# Patient Record
Sex: MALE | Race: WHITE | Employment: FULL TIME | URBAN - METROPOLITAN AREA
[De-identification: names, ages, dates, MRNs, and addresses within clinical notes are randomized per-mention and may not be internally consistent; named-entity substitution may affect disease eponyms.]

---

## 2018-10-08 ENCOUNTER — HOSPITAL ENCOUNTER (EMERGENCY)
Age: 23
Discharge: HOME | End: 2018-10-08
Payer: COMMERCIAL

## 2018-10-08 VITALS
HEART RATE: 68 BPM | DIASTOLIC BLOOD PRESSURE: 90 MMHG | BODY MASS INDEX: 21.56 KG/M2 | OXYGEN SATURATION: 100 % | HEIGHT: 71 IN | SYSTOLIC BLOOD PRESSURE: 138 MMHG | RESPIRATION RATE: 16 BRPM | TEMPERATURE: 97.8 F | WEIGHT: 154 LBS

## 2018-10-08 DIAGNOSIS — F07.81 POST CONCUSSIVE SYNDROME: Primary | ICD-10-CM

## 2018-10-08 DIAGNOSIS — S02.2XXA CLOSED FRACTURE OF NASAL BONE, INITIAL ENCOUNTER: ICD-10-CM

## 2018-10-08 DIAGNOSIS — S16.1XXA ACUTE STRAIN OF NECK MUSCLE, INITIAL ENCOUNTER: ICD-10-CM

## 2018-10-08 PROCEDURE — 99283 EMERGENCY DEPT VISIT LOW MDM: CPT

## 2018-10-08 PROCEDURE — 6360000002 HC RX W HCPCS

## 2018-10-08 PROCEDURE — 96374 THER/PROPH/DIAG INJ IV PUSH: CPT

## 2018-10-08 PROCEDURE — 6370000000 HC RX 637 (ALT 250 FOR IP): Performed by: PHYSICIAN ASSISTANT

## 2018-10-08 RX ORDER — ONDANSETRON 2 MG/ML
4 INJECTION INTRAMUSCULAR; INTRAVENOUS ONCE
Status: DISCONTINUED | OUTPATIENT
Start: 2018-10-08 | End: 2018-10-08 | Stop reason: HOSPADM

## 2018-10-08 RX ORDER — ONDANSETRON 4 MG/1
TABLET, ORALLY DISINTEGRATING ORAL
Status: COMPLETED
Start: 2018-10-08 | End: 2018-10-08

## 2018-10-08 RX ORDER — LIDOCAINE 50 MG/G
1 PATCH TOPICAL DAILY
Qty: 30 PATCH | Refills: 0 | Status: SHIPPED | OUTPATIENT
Start: 2018-10-08

## 2018-10-08 RX ORDER — LIDOCAINE 50 MG/G
1 PATCH TOPICAL ONCE
Status: DISCONTINUED | OUTPATIENT
Start: 2018-10-08 | End: 2018-10-08 | Stop reason: HOSPADM

## 2018-10-08 RX ORDER — CYCLOBENZAPRINE HCL 10 MG
10 TABLET ORAL 3 TIMES DAILY PRN
Qty: 20 TABLET | Refills: 0 | Status: SHIPPED | OUTPATIENT
Start: 2018-10-08 | End: 2018-10-15

## 2018-10-08 RX ORDER — ONDANSETRON 4 MG/1
4-8 TABLET, ORALLY DISINTEGRATING ORAL EVERY 12 HOURS PRN
Qty: 12 TABLET | Refills: 0 | Status: SHIPPED | OUTPATIENT
Start: 2018-10-08

## 2018-10-08 RX ADMIN — ONDANSETRON 4 MG: 4 TABLET, ORALLY DISINTEGRATING ORAL at 11:50

## 2018-10-08 ASSESSMENT — PAIN DESCRIPTION - PAIN TYPE: TYPE: ACUTE PAIN

## 2018-10-08 ASSESSMENT — PAIN DESCRIPTION - LOCATION: LOCATION: HEAD

## 2018-10-08 ASSESSMENT — PAIN SCALES - GENERAL: PAINLEVEL_OUTOF10: 8

## 2018-10-09 NOTE — ED PROVIDER NOTES
note for interpretation of EKG. RADIOLOGY:   Non-plain film images such as CT, Ultrasound and MRI are read by the radiologist. Plain radiographic images are visualized and preliminarily interpreted by the  ED Provider with the below findings:        Interpretation per the Radiologist below, if available at the time of this note:    No orders to display     No results found. PROCEDURES   Unless otherwise noted below, none     Procedures    CRITICAL CARE TIME   N/A    CONSULTS:  None      EMERGENCY DEPARTMENT COURSE and DIFFERENTIAL DIAGNOSIS/MDM:   Vitals:    Vitals:    10/08/18 1020   BP: (!) 138/90   Pulse: 68   Resp: 16   Temp: 97.8 °F (36.6 °C)   TempSrc: Oral   SpO2: 100%   Weight: 154 lb (69.9 kg)   Height: 5' 11\" (1.803 m)       Patient was given the following medications:  Medications   ondansetron (ZOFRAN-ODT) 4 MG disintegrating tablet (4 mg  Given 10/8/18 1150)     Patient presents to the emergency department with nausea, headache and dizziness after head injury done Saturday. He was given Zofran here for his nausea. He is afebrile, appeared nontoxic and hemodynamically stable. He had a normal neurologic examination. Here he had a previous CT scan of the head and facial bones show no acute abnormality besides the broken nose. He currently has no epistaxis here. There is some bruising and swelling of the nose. Informed him to continue to use ice as needed and ibuprofen. I instructed him that his symptoms sound like postconcussive syndrome and that it will not appear on imaging. Informed him that he should just try to avoid any new head injury and follow-up with PCP. I informed him we will treat him for his symptoms. He was also having some tenderness over the right trapezius. He states that his pain was worse with movement of that area. I feel that the patient was likely has strained this muscle. I'll discharge him with prescriptions for Lidoderm, Flexeril and Zofran.   Informed and